# Patient Record
Sex: FEMALE | Race: WHITE | Employment: FULL TIME | URBAN - METROPOLITAN AREA
[De-identification: names, ages, dates, MRNs, and addresses within clinical notes are randomized per-mention and may not be internally consistent; named-entity substitution may affect disease eponyms.]

---

## 2017-07-25 PROCEDURE — 87086 URINE CULTURE/COLONY COUNT: CPT | Performed by: FAMILY MEDICINE

## 2017-08-29 PROCEDURE — 87086 URINE CULTURE/COLONY COUNT: CPT | Performed by: FAMILY MEDICINE

## 2021-03-25 ENCOUNTER — OFFICE VISIT (OUTPATIENT)
Dept: INTEGRATIVE MEDICINE | Facility: CLINIC | Age: 31
End: 2021-03-25

## 2021-03-25 DIAGNOSIS — M54.2 NECK PAIN: Primary | ICD-10-CM

## 2021-03-26 NOTE — PATIENT INSTRUCTIONS
1. For nausea, consider paige teas or pieces of raw paige  - Use pressure point on wrist as described during appointment  2. For relaxation and stress: download Insight Timer Isabela and choose a guided mediation  3.   Consider trying a [employer], mediation or

## 2023-08-08 NOTE — PROGRESS NOTES
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 Complaints:  1. Neck pain and tightness  2. Periodic nausea  3. Fatigue    Initial Consult: /74.   [facility] complains of nausea that occurs periodically throughout the day but has been Dressing: dry sterile dressing